# Patient Record
(demographics unavailable — no encounter records)

---

## 2025-04-03 NOTE — PHYSICAL EXAM

## 2025-04-03 NOTE — HISTORY OF PRESENT ILLNESS
[FreeTextEntry1] : This is a 44 year old former smoker male with medical hx of HLD here to establish care. Last [place] 09/29/2022. He reports of lower back pain and knee pain with long standing. He is requesting note stating he needs shoes with support as he has not stand for prolonged period during work.  Denies chest pain, palpitations, diaphoresis, vision changes, HA, dizziness, syncope, cough, wheezing, SOB/OMREL, edema, fever, chills, infection.